# Patient Record
Sex: FEMALE | Race: WHITE | Employment: FULL TIME | URBAN - METROPOLITAN AREA
[De-identification: names, ages, dates, MRNs, and addresses within clinical notes are randomized per-mention and may not be internally consistent; named-entity substitution may affect disease eponyms.]

---

## 2018-09-19 ENCOUNTER — HOSPITAL ENCOUNTER (OUTPATIENT)
Dept: MAMMOGRAPHY | Facility: CLINIC | Age: 48
Discharge: HOME | End: 2018-09-19
Attending: OBSTETRICS & GYNECOLOGY | Admitting: OBSTETRICS & GYNECOLOGY
Payer: COMMERCIAL

## 2018-09-19 DIAGNOSIS — Z12.31 VISIT FOR SCREENING MAMMOGRAM: ICD-10-CM

## 2018-09-19 PROCEDURE — 77063 BREAST TOMOSYNTHESIS BI: CPT

## 2019-07-09 ENCOUNTER — APPOINTMENT (OUTPATIENT)
Age: 49
Setting detail: DERMATOLOGY
End: 2019-07-09

## 2019-07-09 DIAGNOSIS — Z41.9 ENCOUNTER FOR PROCEDURE FOR PURPOSES OTHER THAN REMEDYING HEALTH STATE, UNSPECIFIED: ICD-10-CM

## 2019-07-09 PROCEDURE — OTHER BOTOX: OTHER

## 2019-07-09 NOTE — PROCEDURE: BOTOX
Consent: Treatment performed today: BOTOX\\n\\nPositive Hx of: \\n\\nNegative Hx / Denies: Sensitivity/Allergies to: albumin, Gram + bacteria proteins, or anesthetics such as Lidocaine; immune compromised; hypersensitivity reactions; autoimmune disorders; neurological disorders; currently pregnant/breast feeding; recent h/o infections, oral or mucosal symptoms/injections, or dental procedures, recent or planned vaccine(s).\\n\\nTreatment areas reviewed with the patient while holding a hand held mirror.\\nDiscussed the anatomy and anatomical changes of the face associated with and due to the chronological aging process.\\nPatient had realistic expectations. \\n\\nPRE-EXISTING ASYMMETRIES & CONSIDERATIONS TO NOTE: \\n\\nDiscussed the mechanism of action for neuromodulators (Botox/Dysport), the anatomy involved, and possible side effects that include but are not limited to: bruising, swelling, flu-like symptoms, headache, nausea, redness, tingling.  Potential complications include, but are not limited to: allergic reaction, asymmetry, blurred or double vision, infection, loss of muscle tone, ptosis, muscle weakness. \\Wilian questions answered, patient verbalized understanding and an informed consent was signed. \\n\\nTotal Units: \\nDilution: 1:1\\nSee injection diagram for dosage/placement and Botox lot # and expiration date. \\n\\nPatient informed that treatment area(s): *** are considered “off label,” non-FDA approved, and the patient verbalized understanding.\\n\\nPatient instructed not to lie down for 4 hours and for the next 24 hours to limit physical activity to avoid rubbing/massaging the treated areas, against receiving a facial, massage or seeing a chiropractor and washing the face is okay, but to wash with fingertips (no Clarisonic brush, washcloth, etc...).\\n\\nExplained to achieve optimal results and correction combo treatments and, or additional treatments/products (i.e. neurotoxin, dermal fillers, Sculptra, laser/light-based/skin tightening, body contouring/fat reduction, facials, chemical peels, skin pen, skin care) may be necessary following the completion of initial treatment recommendations and for maintenance. \\n\\nCONSIDER: \\n\\nSKIN CARE:\\n\\nRTC in 2 weeks if additional softening is needed.\\nRTC in 3 months to refresh Botox.\\n\\nCOLOR KEY FOR DIAGRAM BELOW:\\nRED = 1/2 unit of Botox\\nORANGE = 1 unit of Botox\\nYELLOW = 2 units of Botox\\nGREEN = 3 units of Botox \\nBLUE = 4 units of Botox\\nBROWN = 5 units of Botox \\nBLACK = 6 units of Botox

## 2019-07-23 ENCOUNTER — APPOINTMENT (OUTPATIENT)
Age: 49
Setting detail: DERMATOLOGY
End: 2019-07-23

## 2019-07-23 DIAGNOSIS — Z41.9 ENCOUNTER FOR PROCEDURE FOR PURPOSES OTHER THAN REMEDYING HEALTH STATE, UNSPECIFIED: ICD-10-CM

## 2019-07-23 PROCEDURE — OTHER JEUVEAU: OTHER

## 2019-07-23 NOTE — PROCEDURE: JEUVEAU
Additional Area 5 Units: 0
Detail Level: Detailed
Dilution (U/0.1 Cc): 4
Consent: \\n\\n\\nFOLLOW UP with additional Treatment performed today: Jeuveau.   2 weeks ago I treated Breanna for the 1st time trial with Jeuveau, a newly FDA approved neurotoxin.  Previously Breanna has tried both Botox & Dysport with little to no softening and she reports if she did see some improvement and softening of her lines the result only lasted a few weeks vs the 3-4 month duration as other experience.   Today, she has noticeable improvement with all treated areas (glabella, eyes and mentalis), but still has noticeable movement.   To achieve her desired goal, additional treatment administered today and I will see her for a follow up in another 2 weeks. \\n\\nPositive Hx of: no response to Botox & Dysport.\\n\\nNegative Hx / Denies: Sensitivity/Allergies to: albumin, Gram + bacteria proteins, or anesthetics such as\\nLidocaine; immune compromised; hypersensitivity reactions; autoimmune disorders; neurological disorders;\\ncurrently pregnant/breast feeding; recent h/o infections, oral or mucosal symptoms/injections, or dental\\nprocedures, recent or planned vaccine(s).\\n\\nTreatment areas reviewed with the patient while holding a hand held mirror.\\nDiscussed the anatomy and anatomical changes of the face associated with and due to the chronological aging\\nprocess.\\nPatient had realistic expectations.\\n\\nPRE-EXISTING ASYMMETRIES & CONSIDERATIONS TO NOTE: bilateral eyelid hooding & LEFT eye is\\nslightly stronger than her right.\\n\\nDiscussed the mechanism of action for neuromodulators (Jeuveau) the anatomy involved, and possible side\\neffects that include but are not limited to: bruising, swelling, flu-like symptoms, headache, nausea, redness,\\ntingling. Potential complications include, but are not limited to: allergic reaction, asymmetry, blurred or double\\nvision, infection, loss of muscle tone, ptosis, muscle weakness.\\Wilian questions answered, patient verbalized understanding and an informed consent was signed.\\n\\nJeuveau A Injection Sites:\\nPeriorbital Skin - no additional today \\nGlabellar Complex - 6 units\\nMentalis- 4 units\\n\\nTotal Units: 10\\nDilution: 1:1\\nSee injection diagram for dosage/placement and Botox lot # and expiration date.\\n\\nPatient informed that treatment area(s): mentalis are considered “off label,” non-FDA approved, and the patient\\nverbalized understanding.\\n\\nPatient instructed not to lie down for 4 hours and for the next 24 hours to limit physical activity to avoid\\nrubbing/massaging the treated areas, against receiving a facial, massage or seeing a chiropractor and washing\\nthe face is okay, but to wash with fingertips (no Clarisonic brush, washcloth, etc...).\\n\\nRTC in 2 weeks to see if results will last, which they have not done in the past with Botox or Dysport. \\n\\n\\nCOLOR KEY FOR DIAGRAM BELOW:\\nRED = 1/2 unit of Jeuveau\\nORANGE = 1 unit of Jeuveau\\nYELLOW = 2 units of Jeuveau\\nGREEN = 3 units of Jeuveau\\nBLUE = 4 units of Jeuveau\\nBROWN = 5 units of Jeuveau

## 2019-09-06 ENCOUNTER — RX ONLY (RX ONLY)
Age: 49
End: 2019-09-06

## 2019-09-06 RX ORDER — METRONIDAZOLE 10 MG/G
1% GEL TOPICAL QAM
Qty: 60 | Refills: 0 | Status: ERX | COMMUNITY
Start: 2019-09-06

## 2019-09-06 RX ORDER — BENZOYL PEROXIDE 60 MG/1
6% CLOTH TOPICAL QD
Qty: 60 | Refills: 1 | Status: ERX | COMMUNITY
Start: 2019-09-06

## 2019-09-06 RX ORDER — TAZAROTENE 1 MG/G
0.1% CREAM TOPICAL QHS
Qty: 60 | Refills: 0 | Status: ERX | COMMUNITY
Start: 2019-09-06

## 2019-10-02 ENCOUNTER — APPOINTMENT (OUTPATIENT)
Age: 49
Setting detail: DERMATOLOGY
End: 2019-10-02

## 2019-10-02 DIAGNOSIS — Z41.9 ENCOUNTER FOR PROCEDURE FOR PURPOSES OTHER THAN REMEDYING HEALTH STATE, UNSPECIFIED: ICD-10-CM

## 2019-10-02 PROCEDURE — OTHER FILLERS: OTHER

## 2019-10-02 NOTE — PROCEDURE: FILLERS
Price (Use Numbers Only, No Special Characters Or $): 651 Price (Use Numbers Only, No Special Characters Or $): 229

## 2019-10-02 NOTE — PROCEDURE: FILLERS
Consent: Treatment Performed Today: \\n\\nSam returns requesting additional volume to her lips with accentuation of her lower lip. \\n\\nPositive Hx of:  none. \\n\\nNegative Hx/Denies: Gram + bacteria proteins, or anesthetics such as Lidocaine; Cold Sores, immune compromised; hypersensitivity reactions; autoimmune disorders; neurological disorders; currently pregnant/breast feeding; recent h/o infections, oral or mucosal symptoms/injections, or dental procedures, recent or planned vaccine(s).   \\n\\nTreatment areas reviewed with the patient while holding a hand held mirror.\\nDiscussed the anatomy and anatomical changes of the face associated with and due to the chronological aging process.\\nPatient had realistic expectations.\\n\\nPRE-EXISTING ASYMMETRIES & CONSIDERATIONS TO NOTE:  \\n\\nDiscussed the mechanism of action for HA dermal fillers, the anatomy involved, and possible side effects that include but are not limited to: redness and, or bruising, swelling, firmness, temporary numbness, tingling. Potential complications include, but are not limited to: allergic reaction, asymmetry, blindness, infection, lumpiness, necrosis, palpation of product, nodules, granulomas, hematoma, muscle weakness (from Lidocaine), scars, [place] effect, vascular event, and if lips are treated difficulty speaking.  \\Wilian questions answered, patient verbalized understanding and an informed consent was signed. \\n\\nPhotos taken. \\nSkin prepped with alcohol & chlorexidine, and treatment was performed. \\n\\nTopical numbing applied to her lips aprox 20 min prior to injection.\\nBack filled insulin syringe used to inject: lateral tear trough. \\n30G 1\" cannula used to inject: medial tear tough. \\n\\nPatient informed that the following treatment area(s) are considered \"off label\"  and non-FDA approved: none. \\nPost-Care Instructions were discussed.   \\nDiscussed/offered to Vbeam any bruising if needed at no charge with me in [place] - asked to call if appointment is needed. \\nRecommended gentle application of ice if needed.   Avoid strenuous activity for 48 hours, walking is okay.    Instructed not to massage, rub, or press on the treatment areas for 48 hours.  Patient was encouraged to call with any questions and, or if bruising or pain seems abnormal or worsens.   \\nExplained to achieve optimal results and correction combo treatments and, or additional treatments/products (i.e. neurotoxin, dermal fillers, skin care) may be necessary following the completion of initial treatment recommendations and for maintenance.\\n\\nCONSIDER:   Invited to our Nov 21st Event (Token provided). \\n\\nSKIN CARE: Happy with her skin care & her lashes look great.   Acne prone - recommended a facial, may need to see medical derm. \\n\\nRTC in 2 weeks for follow-up & PRN. Consent: Treatment Performed Today: \\n\\nSam returns requesting additional volume to her lips with accentuation of her lower lip. \\n\\nPositive Hx of:  none. \\n\\nNegative Hx/Denies: Gram + bacteria proteins, or anesthetics such as Lidocaine; Cold Sores, immune compromised; hypersensitivity reactions; autoimmune disorders; neurological disorders; currently pregnant/breast feeding; recent h/o infections, oral or mucosal symptoms/injections, or dental procedures, recent or planned vaccine(s).   \\n\\nTreatment areas reviewed with the patient while holding a hand held mirror.\\nDiscussed the anatomy and anatomical changes of the face associated with and due to the chronological aging process.\\nPatient had realistic expectations.\\n\\nPRE-EXISTING ASYMMETRIES & CONSIDERATIONS TO NOTE:  \\n\\nDiscussed the mechanism of action for HA dermal fillers, the anatomy involved, and possible side effects that include but are not limited to: redness and, or bruising, swelling, firmness, temporary numbness, tingling. Potential complications include, but are not limited to: allergic reaction, asymmetry, blindness, infection, lumpiness, necrosis, palpation of product, nodules, granulomas, hematoma, muscle weakness (from Lidocaine), scars, [place] effect, vascular event, and if lips are treated difficulty speaking.  \\Wilian questions answered, patient verbalized understanding and an informed consent was signed. \\n\\nPhotos taken. \\nSkin prepped with alcohol & chlorexidine, and treatment was performed. \\n\\nTopical numbing applied to her lips aprox 20 min prior to injection.\\nBack filled insulin syringe used to inject: lateral tear trough. \\n30G 1\" cannula used to inject: medial tear tough. \\n\\nPatient informed that the following treatment area(s) are considered \"off label\"  and non-FDA approved: none. \\nPost-Care Instructions were discussed.   \\nDiscussed/offered to Vbeam any bruising if needed at no charge with me in [place] - asked to call if appointment is needed. \\nRecommended gentle application of ice if needed.   Avoid strenuous activity for 48 hours, walking is okay.    Instructed not to massage, rub, or press on the treatment areas for 48 hours.  Patient was encouraged to call with any questions and, or if bruising or pain seems abnormal or worsens.   \\nExplained to achieve optimal results and correction combo treatments and, or additional treatments/products (i.e. neurotoxin, dermal fillers, skin care) may be necessary following the completion of initial treatment recommendations and for maintenance.\\n\\nCONSIDER:   Invited to our Nov 21st Event (Token provided). \\n\\nSKIN CARE: Happy with her skin care & her lashes look great.   Acne prone - recommended a facial, may need to see medical derm. \\n\\nRTC in 2 weeks for follow-up & PRN.

## 2019-10-16 ENCOUNTER — APPOINTMENT (OUTPATIENT)
Age: 49
Setting detail: DERMATOLOGY
End: 2019-11-20

## 2019-10-22 ENCOUNTER — APPOINTMENT (OUTPATIENT)
Age: 49
Setting detail: DERMATOLOGY
End: 2019-10-22

## 2019-10-22 DIAGNOSIS — Z41.9 ENCOUNTER FOR PROCEDURE FOR PURPOSES OTHER THAN REMEDYING HEALTH STATE, UNSPECIFIED: ICD-10-CM

## 2019-10-22 PROCEDURE — OTHER FILLERS: OTHER

## 2019-10-22 NOTE — PROCEDURE: FILLERS
Price (Use Numbers Only, No Special Characters Or $): 970 Price (Use Numbers Only, No Special Characters Or $): 244

## 2019-10-22 NOTE — PROCEDURE: FILLERS
Consent: HA Filler  - Treatment Performed Today: \\n\\nCvilma is here to refresh her Botox and would like to proceed with Voluma for her cheeks and possibly Vollure for her NLF. \\n\\nPositive Hx of: ***becomes dizzy/faint with injections (filler > Botox)***\\n\\nNegative Hx/Denies: Gram + bacteria proteins, or anesthetics such as Lidocaine; Cold Sores, immune compromised; hypersensitivity reactions; autoimmune disorders; neurological disorders; currently pregnant/breast feeding; recent h/o infections, oral or mucosal symptoms/injections, or dental procedures, recent or planned vaccine(s).   \\n\\nTreatment areas reviewed with the patient while holding a hand held mirror.\\nDiscussed the anatomy and anatomical changes of the face associated with and due to the chronological aging process.\\nPatient had realistic expectations.\\n\\nPRE-EXISTING ASYMMETRIES & CONSIDERATIONS TO NOTE:  Deep NLF.  Calipers used for today's consult.  \\n\\nDiscussed the mechanism of action for HA dermal fillers, the anatomy involved, and possible side effects that include but are not limited to: redness and, or bruising, swelling, firmness, temporary numbness, tingling. Potential complications include, but are not limited to: allergic reaction, asymmetry, blindness, infection, lumpiness, necrosis, palpation of product, nodules, granulomas, hematoma, muscle weakness (from Lidocaine), scars, [place] effect, vascular event, and if lips are treated difficulty speaking.  \\Wilian questions answered, patient verbalized understanding and an informed consent was signed. \\n\\nPhotos taken. \\nSkin prepped with alcohol & chlorexidine, and treatment was performed. \\n\\nTopical numbing applied to her lips aprox 20 min prior to injection.\\nBack filled insulin syringe used to inject: lateral tear trough. \\n30G 1\" cannula used to inject: medial tear tough. \\n\\nPatient informed that the following treatment area(s) are considered \"off label\"  and non-FDA approved: Voluma to her Piriform Fossa. \\nPost-Care Instructions were discussed.   \\nDiscussed/offered to Vbeam any bruising if needed at no charge with me in [place] - asked to call if appointment is needed. \\nRecommended gentle application of ice if needed.   Avoid strenuous activity for 48 hours, walking is okay.    Instructed not to massage, rub, or press on the treatment areas for 48 hours.  Patient was encouraged to call with any questions and, or if bruising or pain seems abnormal or worsens.   \\nExplained to achieve optimal results and correction combo treatments and, or additional treatments/products (i.e. neurotoxin, dermal fillers, skin care) may be necessary following the completion of initial treatment recommendations and for maintenance.\\n\\nCONSIDER:   Invited to our Nov 21st Event (Token provided).   \\n\\nSKIN CARE: Applied [employer]ell[employer].    \\n\\nRTC in 2 weeks for follow-up & PRN.\\n\\nCOLOR KEY FOR DIAGRAM BELOW:\\nRED = 1/2 unit of Botox\\nORANGE = 1 unit of Botox\\nYELLOW = 2 units of Botox\\nGREEN = 3 units of Botox\\nBLUE = 4 units of Botox\\nBROWN = 5 units of Botox\\nBLACK = * Filler * Consent: HA Filler  - Treatment Performed Today: \\n\\nCvilma is here to refresh her Botox and would like to proceed with Voluma for her cheeks and possibly Vollure for her NLF. \\n\\nPositive Hx of: ***becomes dizzy/faint with injections (filler > Botox)***\\n\\nNegative Hx/Denies: Gram + bacteria proteins, or anesthetics such as Lidocaine; Cold Sores, immune compromised; hypersensitivity reactions; autoimmune disorders; neurological disorders; currently pregnant/breast feeding; recent h/o infections, oral or mucosal symptoms/injections, or dental procedures, recent or planned vaccine(s).   \\n\\nTreatment areas reviewed with the patient while holding a hand held mirror.\\nDiscussed the anatomy and anatomical changes of the face associated with and due to the chronological aging process.\\nPatient had realistic expectations.\\n\\nPRE-EXISTING ASYMMETRIES & CONSIDERATIONS TO NOTE:  Deep NLF.  Calipers used for today's consult.  \\n\\nDiscussed the mechanism of action for HA dermal fillers, the anatomy involved, and possible side effects that include but are not limited to: redness and, or bruising, swelling, firmness, temporary numbness, tingling. Potential complications include, but are not limited to: allergic reaction, asymmetry, blindness, infection, lumpiness, necrosis, palpation of product, nodules, granulomas, hematoma, muscle weakness (from Lidocaine), scars, [place] effect, vascular event, and if lips are treated difficulty speaking.  \\Wilian questions answered, patient verbalized understanding and an informed consent was signed. \\n\\nPhotos taken. \\nSkin prepped with alcohol & chlorexidine, and treatment was performed. \\n\\nTopical numbing applied to her lips aprox 20 min prior to injection.\\nBack filled insulin syringe used to inject: lateral tear trough. \\n30G 1\" cannula used to inject: medial tear tough. \\n\\nPatient informed that the following treatment area(s) are considered \"off label\"  and non-FDA approved: Voluma to her Piriform Fossa. \\nPost-Care Instructions were discussed.   \\nDiscussed/offered to Vbeam any bruising if needed at no charge with me in [place] - asked to call if appointment is needed. \\nRecommended gentle application of ice if needed.   Avoid strenuous activity for 48 hours, walking is okay.    Instructed not to massage, rub, or press on the treatment areas for 48 hours.  Patient was encouraged to call with any questions and, or if bruising or pain seems abnormal or worsens.   \\nExplained to achieve optimal results and correction combo treatments and, or additional treatments/products (i.e. neurotoxin, dermal fillers, skin care) may be necessary following the completion of initial treatment recommendations and for maintenance.\\n\\nCONSIDER:   Invited to our Nov 21st Event (Token provided).   \\n\\nSKIN CARE: Applied [employer]ellish[employer].    \\n\\nRTC in 2 weeks for follow-up & PRN.\\n\\nCOLOR KEY FOR DIAGRAM BELOW:\\nRED = 1/2 unit of Botox\\nORANGE = 1 unit of Botox\\nYELLOW = 2 units of Botox\\nGREEN = 3 units of Botox\\nBLUE = 4 units of Botox\\nBROWN = 5 units of Botox\\nBLACK = * Filler *

## 2019-12-19 ENCOUNTER — RX ONLY (RX ONLY)
Age: 49
End: 2019-12-19

## 2020-05-21 ENCOUNTER — RX ONLY (RX ONLY)
Age: 50
End: 2020-05-21

## 2020-05-21 RX ORDER — TRIAMCINOLONE ACETONIDE 1 MG/G
0.1% CREAM TOPICAL BID
Qty: 454 | Refills: 0 | Status: ERX | COMMUNITY
Start: 2020-05-21

## 2020-10-06 ENCOUNTER — RX ONLY (RX ONLY)
Age: 50
End: 2020-10-06

## 2020-10-06 RX ORDER — METOPROLOL SUCCINATE 50 MG/1
50 MG TABLET, FILM COATED, EXTENDED RELEASE ORAL BID
Qty: 60 | Refills: 1 | Status: ERX | COMMUNITY
Start: 2020-10-06

## 2021-03-22 ENCOUNTER — RX ONLY (RX ONLY)
Age: 51
End: 2021-03-22

## 2021-03-22 RX ORDER — METOPROLOL SUCCINATE 50 MG/1
50 MG TABLET, FILM COATED, EXTENDED RELEASE ORAL BID
Qty: 60 | Refills: 5 | Status: ERX | COMMUNITY
Start: 2021-03-22

## 2022-01-14 ENCOUNTER — APPOINTMENT (OUTPATIENT)
Dept: URBAN - METROPOLITAN AREA CLINIC 253 | Age: 52
Setting detail: DERMATOLOGY
End: 2022-01-14

## 2022-01-14 DIAGNOSIS — D22 MELANOCYTIC NEVI: ICD-10-CM

## 2022-01-14 PROBLEM — D48.5 NEOPLASM OF UNCERTAIN BEHAVIOR OF SKIN: Status: ACTIVE | Noted: 2022-01-14

## 2022-01-14 PROCEDURE — OTHER DERMTECH: PLA + TERT ADD ON ASSAY: OTHER

## 2022-01-14 ASSESSMENT — LOCATION DETAILED DESCRIPTION DERM: LOCATION DETAILED: LEFT NASAL SIDEWALL

## 2022-01-14 ASSESSMENT — LOCATION ZONE DERM: LOCATION ZONE: NOSE

## 2022-01-14 ASSESSMENT — LOCATION SIMPLE DESCRIPTION DERM: LOCATION SIMPLE: LEFT NOSE

## 2022-01-14 NOTE — PROCEDURE: DERMTECH: PLA + TERT ADD ON ASSAY
RECEIVING UNIT ED HANDOFF REVIEW    ED Nurse Handoff Report was reviewed by: Alexandria Mehta RN on April 15, 2021 at 2:48 PM        Render Post-Care Instructions In Note?: no

## 2022-05-09 ENCOUNTER — RX ONLY (RX ONLY)
Age: 52
End: 2022-05-09

## 2022-05-09 RX ORDER — TRETINOIN AND BENZOYL PEROXIDE 30; 1 MG/G; MG/G
CREAM TOPICAL QHS
Qty: 30 | Refills: 0 | Status: ERX | COMMUNITY
Start: 2022-05-09

## 2023-04-18 ENCOUNTER — RX ONLY (RX ONLY)
Age: 53
End: 2023-04-18

## 2023-04-18 RX ORDER — PREDNISONE 10 MG/1
TABLET ORAL
Qty: 30 | Refills: 0 | Status: ERX | COMMUNITY
Start: 2023-04-18

## 2023-05-06 ENCOUNTER — RX ONLY (RX ONLY)
Age: 53
End: 2023-05-06

## 2023-05-06 RX ORDER — PREDNISONE 10 MG/1
TABLET ORAL
Qty: 30 | Refills: 0 | Status: ERX

## 2023-09-28 ENCOUNTER — RX ONLY (RX ONLY)
Age: 53
End: 2023-09-28

## 2023-09-28 RX ORDER — PREDNISONE 10 MG/1
TABLET ORAL
Qty: 30 | Refills: 1 | Status: ERX

## 2024-05-21 ENCOUNTER — RX ONLY (RX ONLY)
Age: 54
End: 2024-05-21

## 2024-05-21 ENCOUNTER — APPOINTMENT (OUTPATIENT)
Dept: URBAN - METROPOLITAN AREA CLINIC 253 | Age: 54
Setting detail: DERMATOLOGY
End: 2024-05-21

## 2024-05-21 RX ORDER — CLOBETASOL PROPIONATE 0.5 MG/G
0.05 CREAM TOPICAL BID
Qty: 15 | Refills: 0 | Status: ERX | COMMUNITY
Start: 2024-05-21

## 2025-01-21 ENCOUNTER — HOSPITAL ENCOUNTER (EMERGENCY)
Facility: CLINIC | Age: 55
Discharge: HOME | End: 2025-01-21
Attending: EMERGENCY MEDICINE | Admitting: EMERGENCY MEDICINE
Payer: OTHER MISCELLANEOUS

## 2025-01-21 ENCOUNTER — APPOINTMENT (OUTPATIENT)
Dept: CT IMAGING | Facility: CLINIC | Age: 55
End: 2025-01-21
Attending: EMERGENCY MEDICINE
Payer: OTHER MISCELLANEOUS

## 2025-01-21 VITALS
TEMPERATURE: 98.1 F | DIASTOLIC BLOOD PRESSURE: 89 MMHG | HEART RATE: 81 BPM | WEIGHT: 160.5 LBS | OXYGEN SATURATION: 98 % | SYSTOLIC BLOOD PRESSURE: 121 MMHG | BODY MASS INDEX: 25.79 KG/M2 | HEIGHT: 66 IN | RESPIRATION RATE: 16 BRPM

## 2025-01-21 DIAGNOSIS — S09.90XA CLOSED HEAD INJURY, INITIAL ENCOUNTER: ICD-10-CM

## 2025-01-21 DIAGNOSIS — S09.93XA FACIAL INJURY, INITIAL ENCOUNTER: ICD-10-CM
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
## 2025-01-21 PROCEDURE — 70486 CT MAXILLOFACIAL W/O DYE: CPT

## 2025-01-21 PROCEDURE — 99284 EMERGENCY DEPT VISIT MOD MDM: CPT | Mod: 25 | Performed by: EMERGENCY MEDICINE

## 2025-01-21 PROCEDURE — 70450 CT HEAD/BRAIN W/O DYE: CPT

## 2025-01-21 RX ORDER — ONDANSETRON 4 MG/1
4 TABLET, ORALLY DISINTEGRATING ORAL EVERY 8 HOURS PRN
Qty: 10 TABLET | Refills: 0 | Status: SHIPPED | OUTPATIENT
Start: 2025-01-21

## 2025-01-21 ASSESSMENT — COLUMBIA-SUICIDE SEVERITY RATING SCALE - C-SSRS
2. HAVE YOU ACTUALLY HAD ANY THOUGHTS OF KILLING YOURSELF IN THE PAST MONTH?: NO
6. HAVE YOU EVER DONE ANYTHING, STARTED TO DO ANYTHING, OR PREPARED TO DO ANYTHING TO END YOUR LIFE?: NO
1. IN THE PAST MONTH, HAVE YOU WISHED YOU WERE DEAD OR WISHED YOU COULD GO TO SLEEP AND NOT WAKE UP?: NO

## 2025-01-21 ASSESSMENT — ACTIVITIES OF DAILY LIVING (ADL): ADLS_ACUITY_SCORE: 41

## 2025-01-21 NOTE — ED TRIAGE NOTES
CC: Fall    Walking into work around 0655  Slipped  Hit face on sidewalk  Laying on ground for a while  Epistaxis  Nausea post fall  Dull headache - central forehead    Unsure about LOC  Pt states she doesn't remember falling, but remembers the impact to her nose. Feels she landed straight on her nose and right elbow.    Bruising to bridge of nose. Applied ice. Not on thinners. Denies vomiting or vision changes. Notes chronic mild balance difficulty due to hx meniscus injury.    Took 1000 mg tylenol around 0800     Triage Assessment (Adult)       Row Name 01/21/25 1148          Triage Assessment    Airway WDL WDL        Respiratory WDL    Respiratory WDL WDL        Cognitive/Neuro/Behavioral WDL    Cognitive/Neuro/Behavioral WDL WDL

## 2025-01-21 NOTE — ED PROVIDER NOTES
"  Emergency Department Note      History of Present Illness     Chief Complaint   Facial injury     HPI   Tabitha Galo is a 54 year old female with history of hypertension who presents to the ED for evaluation of facial injury after a fall. 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 She states landing directly on her nose and right elbow. Denies loss of consciousness. Notes epistaxis after the fall, bleeding is now controlled. She notes intermittent nausea and headache since fall. Denies vomiting or vision changes. Patient is not on blood thinners.     Independent Historian   None    Review of External Notes   None    Past Medical History     Medical History and Problem List   Anxiety  Hypertension  Depression    Medications   Lexapro  Prinzide, Zestoretic    Surgical History   The patient has no pertinent past surgical history.     Physical Exam     Patient Vitals for the past 24 hrs:   BP Temp Temp src Pulse Resp SpO2 Height Weight   01/21/25 1150 121/89 98.1  F (36.7  C) Oral 81 16 98 % -- --   01/21/25 1147 -- -- -- -- -- -- 1.676 m (5' 6\") 72.8 kg (160 lb 7.9 oz)     Physical Exam  General:              Well-nourished              Speaking in full sentences              GCS 15  Head:              No hematoma or step-off  Eyes:              Conjunctiva without injection or scleral icterus              PERRL              EOM full w/out entrapment or proptosis  ENT:              Moist mucous membranes              Posterior oropharynx clear without erythema or exudate              No tonsillar hypertrophy, exudate, asymmetry, nor uvular deviation              No oral lesions              Bilateral TM translucent and gray without air/fluid level or overlying erythema, bony landmarks visualized.              Nares patent              No septal hematoma              No active bleeding              Ecchymoses and subtle R deviation of " nose              No jaw malocclusion              Pinnae normal              No midface swelling, erythema, or asymmetry  Neck:              Full ROM              No stiffness appreciated  Resp:              Lungs CTAB              No crackles, wheezing or audible rubs              Good air movement  CV:                    Normal rate, regular rhythm              S1 and S2 present              No murmur, gallop or rub  GI:              BS present              Abdomen soft without distention              Non-tender to light and deep palpation              No guarding or rebound tenderness  Skin:              Warm, dry, well perfused              No rashes              Abrasion over right radial head  MSK:              Moves all extremities              No focal deformities or swelling              Full ROM to R elbow to flexion/extension, supination / pronation  Neuro:              Alert              Answers questions appropriately              Moves all extremities equally              Gait stable  Psych:              Normal affect, normal mood       Diagnostics     Lab Results   Labs Ordered and Resulted from Time of ED Arrival to Time of ED Departure - No data to display    Imaging   CT Facial Bones without Contrast   Final Result   IMPRESSION:   HEAD CT:   1.  No CT findings of acute intracranial process.      FACIAL BONE CT:   1.  Minimal cortical regularity of the nasal arch; a recent nasal arch fracture cannot be completely excluded. Please correlate for any point tenderness.   2.  Otherwise, no acute facial fracture identified.         Head CT w/o contrast   Final Result   IMPRESSION:   HEAD CT:   1.  No CT findings of acute intracranial process.      FACIAL BONE CT:   1.  Minimal cortical regularity of the nasal arch; a recent nasal arch fracture cannot be completely excluded. Please correlate for any point tenderness.   2.  Otherwise, no acute facial fracture identified.             Independent  Interpretation   CT Head: No intracranial hemorrhage.    ED Course      Medications Administered   Medications - No data to display    Procedures   Procedures     Discussion of Management   None    ED Course   ED Course as of 01/21/25 1438   Tue Jan 21, 2025   1205 I obtained history and examined the patient as noted above.    1341 I rechecked and updated the patient.We discussed plan for discharge and the patient is comfortable with this.        Additional Documentation  None    Medical Decision Making / Diagnosis     CMS Diagnoses: None    MIPS       None    MDM   Tabitha Galo is a 54 yr old F presenting to the ER for evaluation of a fall and facial injury.  VS on presentation unremarkable.  Hx, exam and ED course as outlined above.  Given mechanism of injury and pain, CT head and facial bones obtained.  This is notable for possible nasal arch fracture, though otherwise is unremarkable.  Remainder of skeletal survey reveals isolated abrasion to R elbow, though full ROM.  Patient declined XR.  C-spine cleared clinically at bedside.  No evidence of ongoing epistaxis, and no evidence of septal hematoma.  Reviewed results.  Closed head injury precautions discussed including red flag symptoms.  Return to ER with severe headache, vomiting, fevers, chills or any other concerns.  Questions answered prior to DC.    Disposition   The patient was discharged.     Diagnosis     ICD-10-CM    1. Fall, initial encounter  W19.XXXA       2. Closed head injury, initial encounter  S09.90XA       3. Facial injury, initial encounter  S09.93XA            Discharge Medications   Discharge Medication List as of 1/21/2025  1:55 PM        START taking these medications    Details   ondansetron (ZOFRAN ODT) 4 MG ODT tab Take 1 tablet (4 mg) by mouth every 8 hours as needed., Disp-10 tablet, R-0, E-Prescribe               Scribe Disclosure:  I, Jennifer Milligan, am serving as a scribe at 12:40 PM on 1/21/2025 to document services personally  performed by Ralph Wiley MD based on my observations and the provider's statements to me.   Scribe Disclosure:  I, Nancy Darcy, am serving as a scribe at 2:31 PM on 1/21/2025 to document services personally performed by Ralph Wiley MD based on my observations and the provider's statements to me.      Ralph Wiley MD  01/21/25 1831

## 2025-01-21 NOTE — DISCHARGE INSTRUCTIONS
Please follow-up closely with ENT as an outpatient    You may use tylenol / ibuprofen for pain    Ice can help reduce swelling    If a persistent area of swelling or deformity exists after swelling has decreased, follow-up can help determine if further reduction is necessary.      Discharge Instructions  Head Injury    You have been seen today for a head injury. Your evaluation included a history and physical examination. You may have had a CT (CAT) scan performed, though most head injuries do not require a scan. Based on this evaluation, your provider today does not feel that your head injury is serious.    Generally, every Emergency Department visit should have a follow-up clinic visit with either a primary or a specialty clinic/provider. Please follow-up as instructed by your emergency provider today.  Return to the Emergency Department if:  You are confused or you are not acting right.  Your headache gets worse or you start to have a really bad headache even with your recommended treatment plan.  You vomit (throw up) more than once.  You have a seizure.  You have trouble walking.  You have weakness or paralysis (cannot move) in an arm or a leg.  You have blood or fluid coming from your ears or nose.  You have new symptoms or anything that worries you.    Sleeping:  It is okay for you to sleep, but someone should wake you up if instructed by your provider, and someone should check on you at your usual time to wake up.     Activity:  Do not drive for at least 24 hours.  Do not drive if you have dizzy spells or trouble concentrating, or remembering things.  Do not return to any contact sports until cleared by your regular provider.     MORE INFORMATION:    Concussion:  A concussion is a minor head injury that may cause temporary problems with the way the brain works. Although concussions are important, they are generally not an emergency or a reason that a person needs to be hospitalized. Some concussion symptoms  include confusion, amnesia (forgetful), nausea (sick to your stomach) and vomiting (throwing up), dizziness, fatigue, memory or concentration problems, irritability and sleep problems. For most people, concussions are mild and temporary but some will have more severe and persistent symptoms that require on-going care and treatment.  CT Scans: Your evaluation today may have included a CT scan (CAT scan) to look for things like bleeding or a skull fracture (broken bone).  CT scans involve radiation and too many CT scans can cause serious health problems like cancer, especially in children.  Because of this, your provider may not have ordered a CT scan today if they think you are at low risk for a serious or life threatening problem.    Blood Thinners. If you take blood thinners, there is a very small risk of delayed bleeding after your head injury. In the days/weeks to come, watch for the symptoms described above particularly headaches, confusion, problems walking, and weakness in an arm or leg.    If you were given a prescription for medicine here today, be sure to read all of the information (including the package insert) that comes with your prescription.  This will include important information about the medicine, its side effects, and any warnings that you need to know about.  The pharmacist who fills the prescription can provide more information and answer questions you may have about the medicine.  If you have questions or concerns that the pharmacist cannot address, please call or return to the Emergency Department.     Remember that you can always come back to the Emergency Department if you are not able to see your regular provider in the amount of time listed above, if you get any new symptoms, or if there is anything that worries you.

## 2025-07-08 ENCOUNTER — RX ONLY (RX ONLY)
Age: 55
End: 2025-07-08

## 2025-07-08 RX ORDER — CLOBETASOL PROPIONATE 0.5 MG/G
OINTMENT TOPICAL
Qty: 15 | Refills: 0 | Status: ERX | COMMUNITY
Start: 2025-07-08